# Patient Record
Sex: FEMALE | Race: WHITE | NOT HISPANIC OR LATINO | Employment: UNEMPLOYED | URBAN - METROPOLITAN AREA
[De-identification: names, ages, dates, MRNs, and addresses within clinical notes are randomized per-mention and may not be internally consistent; named-entity substitution may affect disease eponyms.]

---

## 2018-05-03 ENCOUNTER — OFFICE VISIT (OUTPATIENT)
Dept: INTERNAL MEDICINE | Facility: CLINIC | Age: 28
End: 2018-05-03

## 2018-05-03 VITALS
HEIGHT: 66 IN | TEMPERATURE: 97.1 F | SYSTOLIC BLOOD PRESSURE: 116 MMHG | WEIGHT: 173.6 LBS | BODY MASS INDEX: 27.9 KG/M2 | DIASTOLIC BLOOD PRESSURE: 70 MMHG

## 2018-05-03 DIAGNOSIS — R61 EXCESSIVE SWEATING: ICD-10-CM

## 2018-05-03 DIAGNOSIS — G43.909 MIGRAINE SYNDROME: ICD-10-CM

## 2018-05-03 DIAGNOSIS — R53.83 FATIGUE, UNSPECIFIED TYPE: Primary | ICD-10-CM

## 2018-05-03 LAB
25(OH)D3 SERPL-MCNC: 15 NG/ML
ALBUMIN SERPL-MCNC: 4.9 G/DL (ref 3.2–4.8)
ALBUMIN/GLOB SERPL: 2 G/DL (ref 1.5–2.5)
ALP SERPL-CCNC: 73 U/L (ref 25–100)
ALT SERPL W P-5'-P-CCNC: 12 U/L (ref 7–40)
ANION GAP SERPL CALCULATED.3IONS-SCNC: 8 MMOL/L (ref 3–11)
AST SERPL-CCNC: 17 U/L (ref 0–33)
BASOPHILS # BLD AUTO: 0.03 10*3/MM3 (ref 0–0.2)
BASOPHILS NFR BLD AUTO: 0.4 % (ref 0–1)
BILIRUB SERPL-MCNC: 0.2 MG/DL (ref 0.3–1.2)
BUN BLD-MCNC: 15 MG/DL (ref 9–23)
BUN/CREAT SERPL: 21.4 (ref 7–25)
CALCIUM SPEC-SCNC: 9.5 MG/DL (ref 8.7–10.4)
CHLORIDE SERPL-SCNC: 99 MMOL/L (ref 99–109)
CO2 SERPL-SCNC: 29 MMOL/L (ref 20–31)
CREAT BLD-MCNC: 0.7 MG/DL (ref 0.6–1.3)
DEPRECATED RDW RBC AUTO: 42.8 FL (ref 37–54)
EOSINOPHIL # BLD AUTO: 0.13 10*3/MM3 (ref 0–0.3)
EOSINOPHIL NFR BLD AUTO: 1.6 % (ref 0–3)
ERYTHROCYTE [DISTWIDTH] IN BLOOD BY AUTOMATED COUNT: 12.6 % (ref 11.3–14.5)
GFR SERPL CREATININE-BSD FRML MDRD: 98 ML/MIN/1.73
GLOBULIN UR ELPH-MCNC: 2.5 GM/DL
GLUCOSE BLD-MCNC: 101 MG/DL (ref 70–100)
HCT VFR BLD AUTO: 38.3 % (ref 34.5–44)
HGB BLD-MCNC: 12.4 G/DL (ref 11.5–15.5)
IMM GRANULOCYTES # BLD: 0.03 10*3/MM3 (ref 0–0.03)
IMM GRANULOCYTES NFR BLD: 0.4 % (ref 0–0.6)
LYMPHOCYTES # BLD AUTO: 2.64 10*3/MM3 (ref 0.6–4.8)
LYMPHOCYTES NFR BLD AUTO: 32.7 % (ref 24–44)
MCH RBC QN AUTO: 29.9 PG (ref 27–31)
MCHC RBC AUTO-ENTMCNC: 32.4 G/DL (ref 32–36)
MCV RBC AUTO: 92.3 FL (ref 80–99)
MONOCYTES # BLD AUTO: 0.64 10*3/MM3 (ref 0–1)
MONOCYTES NFR BLD AUTO: 7.9 % (ref 0–12)
NEUTROPHILS # BLD AUTO: 4.6 10*3/MM3 (ref 1.5–8.3)
NEUTROPHILS NFR BLD AUTO: 57 % (ref 41–71)
PLATELET # BLD AUTO: 296 10*3/MM3 (ref 150–450)
PMV BLD AUTO: 10.9 FL (ref 6–12)
POTASSIUM BLD-SCNC: 3.8 MMOL/L (ref 3.5–5.5)
PROT SERPL-MCNC: 7.4 G/DL (ref 5.7–8.2)
RBC # BLD AUTO: 4.15 10*6/MM3 (ref 3.89–5.14)
SODIUM BLD-SCNC: 136 MMOL/L (ref 132–146)
T4 FREE SERPL-MCNC: 1.08 NG/DL (ref 0.89–1.76)
TSH SERPL DL<=0.05 MIU/L-ACNC: 1.55 MIU/ML (ref 0.35–5.35)
VIT B12 BLD-MCNC: 525 PG/ML (ref 211–911)
WBC NRBC COR # BLD: 8.07 10*3/MM3 (ref 3.5–10.8)

## 2018-05-03 PROCEDURE — 85025 COMPLETE CBC W/AUTO DIFF WBC: CPT | Performed by: FAMILY MEDICINE

## 2018-05-03 PROCEDURE — 84439 ASSAY OF FREE THYROXINE: CPT | Performed by: FAMILY MEDICINE

## 2018-05-03 PROCEDURE — 36415 COLL VENOUS BLD VENIPUNCTURE: CPT | Performed by: FAMILY MEDICINE

## 2018-05-03 PROCEDURE — 82607 VITAMIN B-12: CPT | Performed by: FAMILY MEDICINE

## 2018-05-03 PROCEDURE — 84443 ASSAY THYROID STIM HORMONE: CPT | Performed by: FAMILY MEDICINE

## 2018-05-03 PROCEDURE — 82306 VITAMIN D 25 HYDROXY: CPT | Performed by: FAMILY MEDICINE

## 2018-05-03 PROCEDURE — 99204 OFFICE O/P NEW MOD 45 MIN: CPT | Performed by: FAMILY MEDICINE

## 2018-05-03 PROCEDURE — 80053 COMPREHEN METABOLIC PANEL: CPT | Performed by: FAMILY MEDICINE

## 2018-05-03 PROCEDURE — 84702 CHORIONIC GONADOTROPIN TEST: CPT | Performed by: FAMILY MEDICINE

## 2018-05-03 RX ORDER — SUMATRIPTAN 25 MG/1
TABLET, FILM COATED ORAL
Qty: 9 TABLET | Refills: 5 | Status: SHIPPED | OUTPATIENT
Start: 2018-05-03

## 2018-05-03 RX ORDER — SUMATRIPTAN 25 MG/1
TABLET, FILM COATED ORAL
COMMUNITY
Start: 2018-04-16 | End: 2018-05-03 | Stop reason: SDUPTHER

## 2018-05-03 RX ORDER — LOTEPREDNOL ETABONATE 5 MG/G
GEL OPHTHALMIC
COMMUNITY
Start: 2018-04-16

## 2018-05-03 NOTE — PATIENT INSTRUCTIONS
1. FATIGUE- will check labs including a serum pregnancy test. If she is pregnant, she is to see her OB.   2. NEURO- migraine- will RF imitrex today but pt is not take this if pregnant. Discussed that she cannot use excedrin either but she can take 2 tylenol with a cup of coffee. HA should improve while pregnant.   3. DERM- excessive perspiration- will refill drysol but will check pregnancy category before pt uses while pregnant.  4. RECHECK- 1mo unless pregnant

## 2018-05-04 ENCOUNTER — TELEPHONE (OUTPATIENT)
Dept: INTERNAL MEDICINE | Facility: CLINIC | Age: 28
End: 2018-05-04

## 2018-05-04 LAB — HCG INTACT+B SERPL-ACNC: <5 MIU/ML

## 2018-05-04 NOTE — TELEPHONE ENCOUNTER
PATIENT CALLED WANTING TO KNOW HER TEST RESULTS. PLEASE PT A CALL. PATIENT SAID IF SHE DOESN'T ANSWER YOU CAN LEAVE HER A DETAILED MESSAGE ON HER VOICEMAIL.

## 2018-05-04 NOTE — TELEPHONE ENCOUNTER
Jamee called seeking results to her pregnancy test. I did not see any Labs on this, but I did notice on the office note from yesterday that her urine pregnancy test was negative, however it seems the serum test was not ordered, so is this something we can just add on?

## 2018-09-11 ENCOUNTER — OFFICE VISIT (OUTPATIENT)
Dept: INTERNAL MEDICINE | Facility: CLINIC | Age: 28
End: 2018-09-11

## 2018-09-11 ENCOUNTER — TELEPHONE (OUTPATIENT)
Dept: INTERNAL MEDICINE | Facility: CLINIC | Age: 28
End: 2018-09-11

## 2018-09-11 VITALS — HEIGHT: 66 IN | WEIGHT: 178.4 LBS | TEMPERATURE: 97.8 F | BODY MASS INDEX: 28.67 KG/M2

## 2018-09-11 DIAGNOSIS — J01.90 ACUTE BACTERIAL SINUSITIS: Primary | ICD-10-CM

## 2018-09-11 DIAGNOSIS — B96.89 ACUTE BACTERIAL SINUSITIS: Primary | ICD-10-CM

## 2018-09-11 PROCEDURE — 99213 OFFICE O/P EST LOW 20 MIN: CPT | Performed by: FAMILY MEDICINE

## 2018-09-11 RX ORDER — CEFDINIR 300 MG/1
300 CAPSULE ORAL 2 TIMES DAILY
Qty: 20 CAPSULE | Refills: 0 | Status: SHIPPED | OUTPATIENT
Start: 2018-09-11

## 2018-09-11 RX ORDER — FLUTICASONE PROPIONATE 50 MCG
2 SPRAY, SUSPENSION (ML) NASAL DAILY
Qty: 16 G | Refills: 0 | Status: SHIPPED | OUTPATIENT
Start: 2018-09-11

## 2018-09-11 NOTE — PATIENT INSTRUCTIONS
1. RESP- sinusitis- will use omnicef as best antibiotic during pregnancy. Advised to use flonase for the cough and congestion, to be applied with a Q tip.   2. RECHECK- prn

## 2018-09-11 NOTE — TELEPHONE ENCOUNTER
PT HAS BEEN HAVING A LOT OF SINUS ISSUES, AND BEING PREGNANT SHE HASNT REALLY BEEN ABLE TO TAKE ANYTHING. SHE IS AVAILABLE ALL DAY Thursday, IS THERE ANYWHERE SHE CAN BE WORKED IN?

## 2018-09-11 NOTE — PROGRESS NOTES
"Dian St is a 28 y.o. female.     History of Present Illness   Here today as a work in for cough and congestion. Last seen 5/3/18 to re establish. No old records available. Pt is 21 wk IUP.     RESP- today pt reports she has been sick for a week. Is having head congestion with cheek pain. Starting to move to her chest with cough. Phlegm is green. Is taking claritin.     The following portions of the patient's history were reviewed and updated as appropriate: current medications, past family history, past medical history, past social history, past surgical history and problem list.    Review of Systems   HENT: Positive for congestion, postnasal drip and rhinorrhea.    Respiratory: Positive for cough.    Cardiovascular: Negative for chest pain.   Gastrointestinal: Negative for abdominal distention and abdominal pain.   Skin: Negative for color change.   Neurological: Negative for tremors, speech difficulty and headaches.   Psychiatric/Behavioral: Negative for agitation and confusion.   All other systems reviewed and are negative.        Current Outpatient Prescriptions:   •  aluminum chloride (DRYSOL) 20 % external solution, Apply  topically Every Night., Disp: 60 mL, Rfl: 5  •  cefdinir (OMNICEF) 300 MG capsule, Take 1 capsule by mouth 2 (Two) Times a Day., Disp: 20 capsule, Rfl: 0  •  fluticasone (FLONASE) 50 MCG/ACT nasal spray, 2 sprays into the nostril(s) as directed by provider Daily., Disp: 16 g, Rfl: 0  •  LOTEMAX 0.5 % gel ophthalmic gel, , Disp: , Rfl:   •  SUMAtriptan (IMITREX) 25 MG tablet, Take 1/2 to 1 tab prn migraine. May repeat every 2hr to max of 200mg in 24 hr, Disp: 9 tablet, Rfl: 5    Objective     Temp 97.8 °F (36.6 °C)   Ht 167.6 cm (66\")   Wt 80.9 kg (178 lb 6.4 oz)   BMI 28.79 kg/m²     Physical Exam   Constitutional: She is oriented to person, place, and time. She appears well-developed and well-nourished.   HENT:   Right Ear: Tympanic membrane and ear canal normal. "   Left Ear: Tympanic membrane and ear canal normal.   Mouth/Throat: Oropharynx is clear and moist.   Eyes: Pupils are equal, round, and reactive to light. Conjunctivae and EOM are normal.   Neck: No thyromegaly present.   Cardiovascular: Normal rate and regular rhythm.    Pulmonary/Chest: Effort normal and breath sounds normal.   Neurological: She is alert and oriented to person, place, and time.   Skin: Skin is warm and dry.   Psychiatric: She has a normal mood and affect.   Vitals reviewed.      Assessment/Plan   Jamee was seen today for sinusitis.    Diagnoses and all orders for this visit:    Acute bacterial sinusitis  -     cefdinir (OMNICEF) 300 MG capsule; Take 1 capsule by mouth 2 (Two) Times a Day.  -     fluticasone (FLONASE) 50 MCG/ACT nasal spray; 2 sprays into the nostril(s) as directed by provider Daily.      1. RESP- sinusitis- will use omnicef as best antibiotic during pregnancy. Advised to use flonase for the cough and congestion, to be applied with a Q tip.   2. RECHECK- prn

## 2020-01-28 NOTE — PROGRESS NOTES
"Dian St is a 28 y.o. female.     History of Present Illness   Here to re establish. Not seen in over 4yr. No previous records available. Pt had lasic eye surgery 2017 with residual dry eye, etc, seeing ophtho. Sees Gyn.     GYN- pt feels she may be pregnant. She is actively trying. Her period is due in 2 days. Has a 2.4yo. She has been very tired.     NEURO- migraine- currently on prn imiterx. Has been getting RF from Gyn. Has been getting 4 HA a month with 1 of them being severe. Uses excedrin with good results for most and then will add imitrex if needed. Does well with this.      DERM- excessive axillary perspiration. Pt has done well with drysol.     The following portions of the patient's history were reviewed and updated as appropriate: current medications, past family history, past medical history, past social history, past surgical history and problem list.    Review of Systems   Constitutional: Positive for fatigue.   Eyes: Positive for pain, redness and itching.   Cardiovascular: Negative for chest pain.   Gastrointestinal: Positive for nausea. Negative for abdominal distention and abdominal pain.   Genitourinary: Positive for pelvic pain and vaginal discharge.   Skin: Negative for color change.   Neurological: Positive for weakness and headaches. Negative for tremors and speech difficulty.   Psychiatric/Behavioral: Negative for agitation and confusion.   All other systems reviewed and are negative.        Current Outpatient Prescriptions:   •  aluminum chloride (DRYSOL) 20 % external solution, Apply  topically Every Night., Disp: 60 mL, Rfl: 5  •  LOTEMAX 0.5 % gel ophthalmic gel, , Disp: , Rfl:   •  SUMAtriptan (IMITREX) 25 MG tablet, Take 1/2 to 1 tab prn migraine. May repeat every 2hr to max of 200mg in 24 hr, Disp: 9 tablet, Rfl: 5    Objective     /70   Temp 97.1 °F (36.2 °C)   Ht 167.6 cm (66\")   Wt 78.7 kg (173 lb 9.6 oz)   BMI 28.02 kg/m²     Physical Exam "   Constitutional: She is oriented to person, place, and time. She appears well-developed and well-nourished.   HENT:   Right Ear: Tympanic membrane and ear canal normal.   Left Ear: Tympanic membrane and ear canal normal.   Mouth/Throat: Oropharynx is clear and moist.   Eyes: Conjunctivae and EOM are normal. Pupils are equal, round, and reactive to light.   Neck: No thyromegaly present.   Cardiovascular: Normal rate and regular rhythm.    Pulmonary/Chest: Effort normal and breath sounds normal.   Neurological: She is alert and oriented to person, place, and time.   Skin: Skin is warm and dry.   Psychiatric: She has a normal mood and affect.   Vitals reviewed.      Assessment/Plan   Jamee was seen today for establish care.    Diagnoses and all orders for this visit:    Fatigue, unspecified type  -     CBC & Differential  -     Comprehensive Metabolic Panel  -     Vitamin B12  -     Vitamin D 25 Hydroxy  -     TSH  -     T4, Free    Migraine syndrome  -     SUMAtriptan (IMITREX) 25 MG tablet; Take 1/2 to 1 tab prn migraine. May repeat every 2hr to max of 200mg in 24 hr    Excessive sweating  -     aluminum chloride (DRYSOL) 20 % external solution; Apply  topically Every Night.      1. FATIGUE- urine pregnancy test neg. will check labs including a serum pregnancy test. If she is pregnant, she is to see her OB.   2. NEURO- migraine- will RF imitrex today but pt is not take this if pregnant. Discussed that she cannot use excedrin either but she can take 2 tylenol with a cup of coffee. HA should improve while pregnant.   3. DERM- excessive perspiration- will refill drysol but will check pregnancy category before pt uses while pregnant.  4. RECHECK- 1mo unless pregnant          Ivermectin Counseling:  Patient instructed to take medication on an empty stomach with a full glass of water.  Patient informed of potential adverse effects including but not limited to nausea, diarrhea, dizziness, itching, and swelling of the extremities or lymph nodes.  The patient verbalized understanding of the proper use and possible adverse effects of ivermectin.  All of the patient's questions and concerns were addressed.

## 2024-06-10 ENCOUNTER — OFFICE VISIT (OUTPATIENT)
Dept: FAMILY MEDICINE CLINIC | Facility: CLINIC | Age: 34
End: 2024-06-10
Payer: COMMERCIAL

## 2024-06-10 VITALS
SYSTOLIC BLOOD PRESSURE: 118 MMHG | HEART RATE: 89 BPM | WEIGHT: 188 LBS | BODY MASS INDEX: 30.22 KG/M2 | OXYGEN SATURATION: 97 % | TEMPERATURE: 97.5 F | DIASTOLIC BLOOD PRESSURE: 84 MMHG | HEIGHT: 66 IN

## 2024-06-10 DIAGNOSIS — Z13.220 ENCOUNTER FOR LIPID SCREENING FOR CARDIOVASCULAR DISEASE: ICD-10-CM

## 2024-06-10 DIAGNOSIS — R19.8 CHANGE IN BOWEL MOVEMENT: ICD-10-CM

## 2024-06-10 DIAGNOSIS — M54.2 CERVICAL PAIN (NECK): ICD-10-CM

## 2024-06-10 DIAGNOSIS — R42 DIZZINESS: ICD-10-CM

## 2024-06-10 DIAGNOSIS — Z00.00 ENCOUNTER FOR WELL ADULT EXAM WITHOUT ABNORMAL FINDINGS: Primary | ICD-10-CM

## 2024-06-10 DIAGNOSIS — R68.89 FORGETFULNESS: ICD-10-CM

## 2024-06-10 DIAGNOSIS — Z13.6 ENCOUNTER FOR LIPID SCREENING FOR CARDIOVASCULAR DISEASE: ICD-10-CM

## 2024-06-10 DIAGNOSIS — R63.5 WEIGHT GAIN: ICD-10-CM

## 2024-06-10 DIAGNOSIS — E55.9 VITAMIN D DEFICIENCY: ICD-10-CM

## 2024-06-10 DIAGNOSIS — R61 EXCESSIVE SWEATING: ICD-10-CM

## 2024-06-10 DIAGNOSIS — R68.82 LOW LIBIDO: ICD-10-CM

## 2024-06-10 DIAGNOSIS — Z11.59 NEED FOR HEPATITIS C SCREENING TEST: ICD-10-CM

## 2024-06-10 DIAGNOSIS — G43.909 MIGRAINE SYNDROME: ICD-10-CM

## 2024-06-10 PROCEDURE — 99385 PREV VISIT NEW AGE 18-39: CPT | Performed by: PHYSICIAN ASSISTANT

## 2024-06-10 RX ORDER — SUMATRIPTAN 25 MG/1
TABLET, FILM COATED ORAL
Qty: 9 TABLET | Refills: 5 | Status: SHIPPED | OUTPATIENT
Start: 2024-06-10

## 2024-06-10 RX ORDER — CYCLOBENZAPRINE HCL 10 MG
10 TABLET ORAL NIGHTLY PRN
Qty: 30 TABLET | Refills: 2 | Status: SHIPPED | OUTPATIENT
Start: 2024-06-10

## 2024-06-10 RX ORDER — ALUMINUM CHLORIDE 20 %
SOLUTION, NON-ORAL TOPICAL NIGHTLY
Qty: 60 ML | Refills: 5 | Status: SHIPPED | OUTPATIENT
Start: 2024-06-10

## 2024-06-10 RX ORDER — OMEGA-3S/DHA/EPA/FISH OIL/D3 300MG-1000
CAPSULE ORAL
COMMUNITY
Start: 2024-02-15

## 2024-06-10 RX ORDER — CLOBETASOL PROPIONATE 0.5 MG/G
OINTMENT TOPICAL
COMMUNITY
Start: 2024-02-09

## 2024-06-10 RX ORDER — LEVONORGESTREL 52 MG/1
INTRAUTERINE DEVICE INTRAUTERINE
COMMUNITY

## 2024-06-10 NOTE — PROGRESS NOTES
Subjective   Jamee St is a 34 y.o. female.     History of Present Illness   History of Present Illness  The patient is a 34-year-old female who presents to establish care and for an annual physical exam.    The patient's last primary care visit was during her pregnancy in 2015. Approximately 3 years ago, she consulted a provider for a sinus infections and underwent routine laboratory tests. Her most recent gynecological examination was in 02/2024, during which a Pap smear revealed atypical cells, prompting ongoing monitoring. Her vitamin D level was recorded at 12, prompting a daily regimen of vitamin D. She reports experiencing fatigue, hair loss, and nausea post-lunch, with dizzy spells occurring approximately once a week. Over the past year, she has noticed fluctuations in her weight, although no changes have been made to her nutritional intake. She has a history of anemia during her childhood and gestational diabetes. She does not take any additional supplements. She has a Mirena intrauterine device (IUD) in place since 2021, having previously tolerated it well. She reports decreased libido, forgetfulness, and decreased confidence. She experiences frequent neck popping. For her headaches, she takes 4 ibuprofen tablets, approximately once a month.    The patient has previously sought treatment with ortho for neck issues, which included traction and a cervical pillow. However, the pain has since recurred. She has found relief from neck popping with traction and a cervical massager.    The patient expresses concern about potential Irritable Bowel Syndrome (IBS), which began approximately 1 month ago. She experiences 2 to 3 loose stools weekly. She does not experience nausea or localized pain over her gallbladder. She has been diagnosed with constipation and straining during bowel movements. She takes antidiarrheal medication, which provides relief. She does not suspect a gluten sensitivity.    The patient  "experiences 3 to 4 sinus infections annually, which she attributes to allergies. She uses a neti pot and occasionally uses a Medrol Dosepak and antibiotics when her symptoms worsen.    Hx of excess sweating.  She uses Drysol for relief. She has severe temporomandibular joint disorder (TMJ).   She denies any autoimmune issues in her family. Her mother has IBS.       The following portions of the patient's history were reviewed and updated as appropriate: allergies, current medications, past family history, past medical history, past social history, past surgical history, and problem list.    Review of Systems  As noted per HPI     Objective   Blood pressure 118/84, pulse 89, temperature 97.5 °F (36.4 °C), height 167.6 cm (66\"), weight 85.3 kg (188 lb), SpO2 97%.     Physical Exam  Vitals reviewed.   Constitutional:       Appearance: Normal appearance.   Cardiovascular:      Rate and Rhythm: Normal rate and regular rhythm.   Pulmonary:      Effort: Pulmonary effort is normal.      Breath sounds: Normal breath sounds.   Abdominal:      General: Abdomen is flat. Bowel sounds are normal.      Tenderness: There is no abdominal tenderness.   Skin:     General: Skin is warm and dry.   Neurological:      Mental Status: She is alert and oriented to person, place, and time.   Psychiatric:         Mood and Affect: Mood normal.         Behavior: Behavior normal.         Results      Assessment & Plan   Assessment & Plan  1. Annual physical examination.  A comprehensive panel of laboratory tests will be conducted to assess her vitamin D levels, iron, B12, cortisol, insulin, and magnesium levels. The patient will be informed of the results upon their availability.    2. Cervicalgia.  The patient will be referred to [facility] for physical therapy. A muscle relaxant will be prescribed for the patient to take as needed at bedtime.    3. Irritable Bowel Syndrome (IBS).  An abdominal x-ray will be obtained to evaluate for " potential constipation. A basic food allergy profile and a celiac screen will be conducted.    4. Migraine headaches.  The patient's prescription for Imitrex will be renewed.         Diagnoses and all orders for this visit:    1. Encounter for well adult exam without abnormal findings (Primary)  -     CBC w AUTO Differential  -     Comprehensive metabolic panel  -     TSH  -     T4, free  -     Thyroid Antibodies  -     Hemoglobin A1c  -     Lipid Panel  -     T3, free  -     Hepatitis C antibody  -     Vitamin D 25 hydroxy  -     Iron Profile  -     Vitamin B12  -     Folate  -     Cortisol - AM  -     Insulin, Total  -     Magnesium  -     Celiac Disease Antibody Screen  -     Food Allergy Profile    2. Vitamin D deficiency  -     Vitamin D 25 hydroxy    3. Dizziness  -     TSH  -     T4, free  -     Hemoglobin A1c  -     T3, free  -     Iron Profile  -     Vitamin B12  -     Folate  -     Cortisol - AM  -     Insulin, Total  -     Magnesium    4. Weight gain  -     CBC w AUTO Differential  -     Comprehensive metabolic panel  -     TSH  -     T4, free  -     Thyroid Antibodies    5. Encounter for lipid screening for cardiovascular disease  -     TSH  -     T4, free  -     Thyroid Antibodies  -     Lipid Panel    6. Need for hepatitis C screening test  -     Hepatitis C antibody    7. Change in bowel movement  -     Magnesium  -     Celiac Disease Antibody Screen  -     Food Allergy Profile  -     XR Abdomen 1 View    8. Low libido  -     CBC w AUTO Differential  -     Comprehensive metabolic panel  -     TSH  -     T4, free  -     Thyroid Antibodies  -     Iron Profile  -     Vitamin B12  -     Folate    9. Forgetfulness  -     CBC w AUTO Differential  -     Comprehensive metabolic panel  -     TSH  -     T4, free  -     Thyroid Antibodies  -     Iron Profile  -     Vitamin B12  -     Folate    10. Cervical pain (neck)  -     Ambulatory Referral to Physical Therapy  -     cyclobenzaprine (FLEXERIL) 10 MG tablet;  Take 1 tablet by mouth At Night As Needed for Muscle Spasms.  Dispense: 30 tablet; Refill: 2    11. Migraine syndrome  -     SUMAtriptan (IMITREX) 25 MG tablet; Take 1/2 to 1 tab prn migraine. May repeat every 2hr to max of 200mg in 24 hr  Dispense: 9 tablet; Refill: 5  -     aluminum chloride (Drysol) 20 % external solution; Apply  topically to the appropriate area as directed Every Night.  Dispense: 60 mL; Refill: 5    12. Excessive sweating  -     aluminum chloride (Drysol) 20 % external solution; Apply  topically to the appropriate area as directed Every Night.  Dispense: 60 mL; Refill: 5      Counseling was given to patient for the following topics: instructions for management, risk factor reductions, patient and family education, impressions, and risks and benefits of treatment options . Total time of the encounter was 20 minutes and 10 minutes was spent counseling.           Patient or patient representative verbalized consent for the use of Ambient Listening during the visit with  DMITRY Hunt for chart documentation. 6/23/2024  12:25 EDT

## 2024-06-13 ENCOUNTER — LAB (OUTPATIENT)
Dept: FAMILY MEDICINE CLINIC | Facility: CLINIC | Age: 34
End: 2024-06-13
Payer: COMMERCIAL

## 2024-06-16 LAB
25(OH)D3+25(OH)D2 SERPL-MCNC: 22.5 NG/ML (ref 30–100)
ALBUMIN SERPL-MCNC: 4.8 G/DL (ref 3.9–4.9)
ALBUMIN/GLOB SERPL: 2 {RATIO}
ALP SERPL-CCNC: 87 IU/L (ref 44–121)
ALT SERPL-CCNC: 25 IU/L (ref 0–32)
AST SERPL-CCNC: 23 IU/L (ref 0–40)
BASOPHILS # BLD AUTO: 0 X10E3/UL (ref 0–0.2)
BASOPHILS NFR BLD AUTO: 1 %
BILIRUB SERPL-MCNC: 0.3 MG/DL (ref 0–1.2)
BUN SERPL-MCNC: 10 MG/DL (ref 6–20)
BUN/CREAT SERPL: 16 (ref 9–23)
CALCIUM SERPL-MCNC: 9.6 MG/DL (ref 8.7–10.2)
CHLORIDE SERPL-SCNC: 100 MMOL/L (ref 96–106)
CHOLEST SERPL-MCNC: 185 MG/DL (ref 100–199)
CLAM IGE QN: <0.1 KU/L
CO2 SERPL-SCNC: 20 MMOL/L (ref 20–29)
CODFISH IGE QN: <0.1 KU/L
CONV CLASS DESCRIPTION: NORMAL
CORN IGE QN: <0.1 KU/L
CORTIS AM PEAK SERPL-MCNC: 15.2 UG/DL (ref 6.2–19.4)
COW MILK IGE QN: <0.1 KU/L
CREAT SERPL-MCNC: 0.64 MG/DL (ref 0.57–1)
EGFRCR SERPLBLD CKD-EPI 2021: 117 ML/MIN/1.73
EGG WHITE IGE QN: <0.1 KU/L
EOSINOPHIL # BLD AUTO: 0.1 X10E3/UL (ref 0–0.4)
EOSINOPHIL NFR BLD AUTO: 1 %
ERYTHROCYTE [DISTWIDTH] IN BLOOD BY AUTOMATED COUNT: 12.4 % (ref 11.7–15.4)
FOLATE SERPL-MCNC: 10.3 NG/ML
GLIADIN PEPTIDE IGA SER-ACNC: 4 UNITS (ref 0–19)
GLOBULIN SER CALC-MCNC: 2.4 G/DL (ref 1.5–4.5)
GLUCOSE SERPL-MCNC: 93 MG/DL (ref 70–99)
HBA1C MFR BLD: 5.7 % (ref 4.8–5.6)
HCT VFR BLD AUTO: 38.7 % (ref 34–46.6)
HCV IGG SERPL QL IA: NON REACTIVE
HDLC SERPL-MCNC: 44 MG/DL
HGB BLD-MCNC: 13 G/DL (ref 11.1–15.9)
IGA SERPL-MCNC: 164 MG/DL (ref 87–352)
IMM GRANULOCYTES # BLD AUTO: 0 X10E3/UL (ref 0–0.1)
IMM GRANULOCYTES NFR BLD AUTO: 1 %
INSULIN SERPL-ACNC: 18.6 UIU/ML (ref 2.6–24.9)
IRON SATN MFR SERPL: 26 % (ref 15–55)
IRON SERPL-MCNC: 88 UG/DL (ref 27–159)
LDLC SERPL CALC-MCNC: 113 MG/DL (ref 0–99)
LYMPHOCYTES # BLD AUTO: 2 X10E3/UL (ref 0.7–3.1)
LYMPHOCYTES NFR BLD AUTO: 31 %
MAGNESIUM SERPL-MCNC: 2.1 MG/DL (ref 1.6–2.3)
MCH RBC QN AUTO: 30.2 PG (ref 26.6–33)
MCHC RBC AUTO-ENTMCNC: 33.6 G/DL (ref 31.5–35.7)
MCV RBC AUTO: 90 FL (ref 79–97)
MONOCYTES # BLD AUTO: 0.5 X10E3/UL (ref 0.1–0.9)
MONOCYTES NFR BLD AUTO: 8 %
NEUTROPHILS # BLD AUTO: 3.7 X10E3/UL (ref 1.4–7)
NEUTROPHILS NFR BLD AUTO: 58 %
PEANUT IGE QN: <0.1 KU/L
PLATELET # BLD AUTO: 296 X10E3/UL (ref 150–450)
POTASSIUM SERPL-SCNC: 3.9 MMOL/L (ref 3.5–5.2)
PROT SERPL-MCNC: 7.2 G/DL (ref 6–8.5)
RBC # BLD AUTO: 4.3 X10E6/UL (ref 3.77–5.28)
SCALLOP IGE QN: <0.1 KU/L
SESAME SEED IGE QN: <0.1 KU/L
SHRIMP IGE QN: <0.1 KU/L
SODIUM SERPL-SCNC: 140 MMOL/L (ref 134–144)
SOYBEAN IGE QN: <0.1 KU/L
T3FREE SERPL-MCNC: 3.9 PG/ML (ref 2–4.4)
T4 FREE SERPL-MCNC: 1.02 NG/DL (ref 0.82–1.77)
THYROGLOB AB SERPL-ACNC: <1 IU/ML (ref 0–0.9)
THYROPEROXIDASE AB SERPL-ACNC: <9 IU/ML (ref 0–34)
TIBC SERPL-MCNC: 345 UG/DL (ref 250–450)
TRIGL SERPL-MCNC: 156 MG/DL (ref 0–149)
TSH SERPL DL<=0.005 MIU/L-ACNC: 2.21 UIU/ML (ref 0.45–4.5)
TTG IGA SER-ACNC: <2 U/ML (ref 0–3)
UIBC SERPL-MCNC: 257 UG/DL (ref 131–425)
VIT B12 SERPL-MCNC: 372 PG/ML (ref 232–1245)
VLDLC SERPL CALC-MCNC: 28 MG/DL (ref 5–40)
WALNUT IGE QN: <0.1 KU/L
WBC # BLD AUTO: 6.4 X10E3/UL (ref 3.4–10.8)
WHEAT IGE QN: <0.1 KU/L

## 2024-06-18 ENCOUNTER — PATIENT ROUNDING (BHMG ONLY) (OUTPATIENT)
Dept: FAMILY MEDICINE CLINIC | Facility: CLINIC | Age: 34
End: 2024-06-18
Payer: COMMERCIAL

## 2024-06-19 ENCOUNTER — TELEPHONE (OUTPATIENT)
Dept: FAMILY MEDICINE CLINIC | Facility: CLINIC | Age: 34
End: 2024-06-19
Payer: COMMERCIAL

## 2024-06-19 NOTE — TELEPHONE ENCOUNTER
Regarding: Test results  Contact: [phone]  ----- Message from DMITRY Hunt sent at 6/19/2024  2:37 PM EDT -----  See result note     ----- Message from Jamee St to Sari Peñaloza PA sent at 6/19/2024 10:41 AM -----   I received my results on the portal but haven’t got a call, just not feeling good and wanted to know next steps. Thanks so much!

## 2024-06-20 ENCOUNTER — TELEPHONE (OUTPATIENT)
Dept: FAMILY MEDICINE CLINIC | Facility: CLINIC | Age: 34
End: 2024-06-20
Payer: COMMERCIAL

## 2024-06-20 DIAGNOSIS — E55.9 VITAMIN D DEFICIENCY: Primary | ICD-10-CM

## 2024-06-20 RX ORDER — ERGOCALCIFEROL 1.25 MG/1
50000 CAPSULE ORAL WEEKLY
Qty: 5 CAPSULE | Refills: 5 | Status: SHIPPED | OUTPATIENT
Start: 2024-06-20

## 2024-06-20 NOTE — TELEPHONE ENCOUNTER
Pt would like to know if you would be able to send in the vit d to the pharmacy.     Please advise

## 2024-06-21 ENCOUNTER — TELEPHONE (OUTPATIENT)
Dept: FAMILY MEDICINE CLINIC | Facility: CLINIC | Age: 34
End: 2024-06-21
Payer: COMMERCIAL

## 2024-06-24 NOTE — TELEPHONE ENCOUNTER
Xr of abdomen showed formed stool throughout colon consistent with constipation. Recommend miralax OTC daily until bowels regulate. If not helping clear out stool, recommend magnesium citrate OTC liquid.

## 2024-07-02 ENCOUNTER — TELEPHONE (OUTPATIENT)
Dept: FAMILY MEDICINE CLINIC | Facility: CLINIC | Age: 34
End: 2024-07-02
Payer: COMMERCIAL

## 2024-07-02 NOTE — TELEPHONE ENCOUNTER
With her vit d still being deficient even with regular supplementation, I sent the high dose once weekly D2. This should help bump up levels and then she can go back to OTC D3 daily.

## 2024-07-02 NOTE — TELEPHONE ENCOUNTER
----- Message from Pricila GILLIAM sent at 7/1/2024  5:32 PM EDT -----  Regarding: FW: Vitamin D   Contact: [phone]    ----- Message -----  From: Jamee St  Sent: 6/28/2024   6:36 AM EDT  To: [facility]  Subject: Vitamin D                                        It looks like Vitamin D2 was sent in but last last rx was D3 please resend the Vitamin D3.

## 2024-07-24 ENCOUNTER — TELEPHONE (OUTPATIENT)
Dept: FAMILY MEDICINE CLINIC | Facility: CLINIC | Age: 34
End: 2024-07-24
Payer: COMMERCIAL

## 2024-07-24 DIAGNOSIS — E66.9 OBESITY (BMI 30.0-34.9): Primary | ICD-10-CM

## 2024-07-24 NOTE — TELEPHONE ENCOUNTER
"Spoke w/ pt and she say's, she is really not having that issue anymore. She thought it was just \"job stress\" related.   "

## 2024-07-24 NOTE — TELEPHONE ENCOUNTER
Rx sent. We will do initial prior authorization with insurance for weight loss aids, but we do not do recurrent PA/ MICKI if a dose is unavailable from pharmacy due to back order. Will need to follow up in 3 months from start date

## 2024-07-24 NOTE — TELEPHONE ENCOUNTER
Please call patient. When I saw her for her new patient visit in June she was experiencing GI symptoms and bowel changes. GLP-1 injections can cause significant GI side effects with worsening constipation, diarrhea and nausea. Is she sure she wants to try this?

## 2024-07-24 NOTE — TELEPHONE ENCOUNTER
----- Message from Yesenia ZAMUDIO sent at 7/24/2024 11:47 AM EDT -----  Regarding: FW: Shot   Contact: [phone]    ----- Message -----  From: Jamee St  Sent: 7/24/2024  11:25 AM EDT  To: [facility] Pool  Subject: Shot                                             Hello,     wanted to let y’all know that our insurance is now active. I would like to go ahead a proceed getting the shot for weight loss. Before seeing you I already worked out and starting dieting. I’m feeling very discouraged a depressed about my weight and with now being a pre diabetic I’m ready to start it. I think it will good changes for my well being. Can we please start the process. I’ve tried losing weight for years now and nothing has helped. Thanks so much!

## 2024-08-02 ENCOUNTER — PATIENT MESSAGE (OUTPATIENT)
Dept: FAMILY MEDICINE CLINIC | Facility: CLINIC | Age: 34
End: 2024-08-02
Payer: COMMERCIAL

## 2024-08-05 ENCOUNTER — TELEPHONE (OUTPATIENT)
Dept: FAMILY MEDICINE CLINIC | Facility: CLINIC | Age: 34
End: 2024-08-05
Payer: COMMERCIAL

## 2024-08-05 NOTE — TELEPHONE ENCOUNTER
Case Management Note    Received VM from client's intake assessor Olivia Helps requesting to set a time for Olivia to meet with client in person on site to discuss case management services. Reviewed events of the weekend and noted mom's report she is going to evict client's sister, Tigre. Eagle noted support of this plan and reported she connected yesterday with client's CPS , Rehana Berumen.    PA for Zepbound  Updated insurance  No need to resubmit, Maru started the first PA with correct insurance and it was denied  LVM to PT

## 2024-08-05 NOTE — TELEPHONE ENCOUNTER
From: Jamee St  To: Sari Peñaloza  Sent: 8/2/2024 3:22 PM EDT  Subject: Zepbound    I’m checking on my pre auth through my insurance on the Zepbound. I did send our new insurance:)

## 2024-08-08 ENCOUNTER — TELEPHONE (OUTPATIENT)
Dept: FAMILY MEDICINE CLINIC | Facility: CLINIC | Age: 34
End: 2024-08-08
Payer: COMMERCIAL

## 2024-08-08 DIAGNOSIS — E66.9 OBESITY (BMI 30-39.9): Primary | ICD-10-CM

## 2024-08-08 RX ORDER — SEMAGLUTIDE 0.25 MG/.5ML
0.25 INJECTION, SOLUTION SUBCUTANEOUS
Qty: 2 ML | Refills: 0 | Status: SHIPPED | OUTPATIENT
Start: 2024-08-08

## 2024-08-08 NOTE — TELEPHONE ENCOUNTER
Regarding: zepbound  Contact: [phone]  ----- Message from Marissa English MA sent at 8/8/2024 10:55 AM EDT -----  Weight loss medication denied for Zepbound     ----- Message sent from Marissa English MA to Jamee St at 8/8/2024 10:54 AM -----   Yes I will send this to Sari, we were just talking about your A1C because you had requested a diagnosis of Pre- diabetes. If your Insurance did not except the Zepbound, more than likely they will not approve any other injections.  There are other medications (oral) that might be approved, Phentermine would need to be prescribe by a MD. just depends on your insurance, this would be if they pay for any weight loss medications. I know some insurance's will not cover any weight loss medication.  Marissa MALDONADO      ----- Message -----       From:Jamee St       Sent:8/6/2024  5:11 PM EDT         To:User Message Message List    Subject:zepbound    Please speak to Preethi about switching to a different kind of injection. I have several friends that were able to get it approved and they see you all and don’t have a diagnosis for diabetes?       ----- Message -----       From:Marissa YAÑEZ       Sent:8/6/2024  9:59 AM EDT         To:Jamee St    Subject:john Mancuso, I noticed after starting the new PA we did use the correct Insurance the first time,  there is no need to continue this new PA. A1C levels are not high enough to consider a Diagnosis for Prediabetes, this will need to be at least 6.5 before insurance will pay for medication. Your A1C was 5.7 this is why the Diagnosis is for weight loss  Marissa MALDONADO

## 2024-08-08 NOTE — TELEPHONE ENCOUNTER
Will send in Wegovy to see if this is covered but it is likely her insurance company does not cover for weight loss aids. Unfortunately, there is nothing we can do on our end if these are not on her insurance's formulary. She can try discount card on company's website to see if this makes medications more affordable or go to a weight loss clinic such as [facility] or [facility] that offer compounding options at a flat rate without insurance.

## 2024-08-20 ENCOUNTER — TELEPHONE (OUTPATIENT)
Dept: FAMILY MEDICINE CLINIC | Facility: CLINIC | Age: 34
End: 2024-08-20
Payer: COMMERCIAL

## 2024-08-20 NOTE — TELEPHONE ENCOUNTER
Pt called to check status of wellness form she dropped off on Thursday.  Pt states her  will be by tomorrow to pick it up.

## 2024-09-26 ENCOUNTER — TELEPHONE (OUTPATIENT)
Dept: FAMILY MEDICINE CLINIC | Facility: CLINIC | Age: 34
End: 2024-09-26

## 2024-09-26 DIAGNOSIS — E66.9 OBESITY (BMI 30-39.9): ICD-10-CM

## 2024-09-26 RX ORDER — SEMAGLUTIDE 0.25 MG/.5ML
0.25 INJECTION, SOLUTION SUBCUTANEOUS
Qty: 2 ML | Refills: 0 | Status: SHIPPED | OUTPATIENT
Start: 2024-09-26

## 2024-09-26 RX ORDER — SEMAGLUTIDE 0.25 MG/.5ML
0.25 INJECTION, SOLUTION SUBCUTANEOUS
Qty: 2 ML | Refills: 0 | Status: CANCELLED | OUTPATIENT
Start: 2024-09-26

## 2024-09-26 NOTE — TELEPHONE ENCOUNTER
Caller: Jamee St    Relationship: Self    Best call back number: [phone]     Requested Prescriptions:   Requested Prescriptions     Pending Prescriptions Disp Refills    Semaglutide-Weight Management (Wegovy) 0.25 MG/0.5ML solution auto-injector 2 mL 0     Sig: Inject 0.5 mL under the skin into the appropriate area as directed Every 7 (Seven) Days.        Pharmacy where request should be sent: [id number] - [address] [phone] [facility] [phone] [facility]     Last office visit with prescribing clinician: 6/10/2024   Last telemedicine visit with prescribing clinician: Visit date not found   Next office visit with prescribing clinician: 6/12/2025     Additional details provided by patient: PATIENT ADVISED HER INSURANCE WON'T COVER THE ZEPBOUND BUT TOLD HER THEY MAY COVER WEGOVY.      Does the patient have less than a 3 day supply:  [x] Yes  [] No    Would you like a call back once the refill request has been completed: [x] Yes [] No    If the office needs to give you a call back, can they leave a voicemail: [x] Yes [] No    Laura Tan Rep   09/26/24 08:10 EDT

## 2024-09-26 NOTE — TELEPHONE ENCOUNTER
Resent rx as pt said they did not get it, however she says her ins will cover this for prediabetes so PA may get sent over.

## 2025-04-08 ENCOUNTER — TELEPHONE (OUTPATIENT)
Dept: FAMILY MEDICINE CLINIC | Facility: CLINIC | Age: 35
End: 2025-04-08

## 2025-04-08 NOTE — TELEPHONE ENCOUNTER
Caller: Jamee St    Relationship to patient: Self    Best call back number: [phone]    Chief complaint: PHYSICAL     Type of visit: PHYSICAL     Requested date: A FRIDAY     If rescheduling, when is the original appointment: 06/12/2025    Additional notes:PATIENT IS CALLING IN SHE RECEIVED  A CALL ASKING HE RESCHEDULE THE APPOINTMENT SHE NEEDS A FRIDAY APPOINTMENT BUT THERE ARE NONE AVAILABLE DHE PREFERS TO SEE IVORY BUT IF SHE DOES NOT HAVE A FRIDAY THE PATIENT STATES THAT SHE WOULD LIKE TO SEE DOCTOR WANG ON A FRIDAY

## 2025-06-20 ENCOUNTER — OFFICE VISIT (OUTPATIENT)
Dept: FAMILY MEDICINE CLINIC | Facility: CLINIC | Age: 35
End: 2025-06-20
Payer: COMMERCIAL

## 2025-06-20 ENCOUNTER — PRIOR AUTHORIZATION (OUTPATIENT)
Dept: FAMILY MEDICINE CLINIC | Facility: CLINIC | Age: 35
End: 2025-06-20
Payer: COMMERCIAL

## 2025-06-20 VITALS
HEIGHT: 66 IN | TEMPERATURE: 98.6 F | SYSTOLIC BLOOD PRESSURE: 112 MMHG | HEART RATE: 86 BPM | WEIGHT: 179 LBS | DIASTOLIC BLOOD PRESSURE: 80 MMHG | RESPIRATION RATE: 18 BRPM | BODY MASS INDEX: 28.77 KG/M2 | OXYGEN SATURATION: 98 %

## 2025-06-20 DIAGNOSIS — E66.3 OVERWEIGHT (BMI 25.0-29.9): ICD-10-CM

## 2025-06-20 DIAGNOSIS — J06.9 ACUTE URI: ICD-10-CM

## 2025-06-20 DIAGNOSIS — G43.909 MIGRAINE SYNDROME: ICD-10-CM

## 2025-06-20 DIAGNOSIS — H66.001 NON-RECURRENT ACUTE SUPPURATIVE OTITIS MEDIA OF RIGHT EAR WITHOUT SPONTANEOUS RUPTURE OF TYMPANIC MEMBRANE: ICD-10-CM

## 2025-06-20 DIAGNOSIS — R73.03 PREDIABETES: ICD-10-CM

## 2025-06-20 DIAGNOSIS — J30.89 ALLERGIC RHINITIS DUE TO OTHER ALLERGIC TRIGGER, UNSPECIFIED SEASONALITY: ICD-10-CM

## 2025-06-20 DIAGNOSIS — R61 EXCESSIVE SWEATING: ICD-10-CM

## 2025-06-20 DIAGNOSIS — R68.82 LOW LIBIDO: ICD-10-CM

## 2025-06-20 DIAGNOSIS — R53.83 OTHER FATIGUE: ICD-10-CM

## 2025-06-20 DIAGNOSIS — M54.2 CERVICAL PAIN (NECK): ICD-10-CM

## 2025-06-20 DIAGNOSIS — E55.9 VITAMIN D DEFICIENCY: ICD-10-CM

## 2025-06-20 DIAGNOSIS — Z00.00 ANNUAL PHYSICAL EXAM: Primary | ICD-10-CM

## 2025-06-20 RX ORDER — NYSTATIN 100000 [USP'U]/G
POWDER TOPICAL
COMMUNITY
Start: 2025-03-04

## 2025-06-20 RX ORDER — CYCLOBENZAPRINE HCL 10 MG
10 TABLET ORAL NIGHTLY PRN
Qty: 30 TABLET | Refills: 2 | Status: SHIPPED | OUTPATIENT
Start: 2025-06-20

## 2025-06-20 RX ORDER — SUMATRIPTAN SUCCINATE 25 MG/1
TABLET ORAL
Qty: 9 TABLET | Refills: 5 | Status: SHIPPED | OUTPATIENT
Start: 2025-06-20

## 2025-06-20 RX ORDER — PREDNISONE 10 MG/1
TABLET ORAL
Qty: 21 TABLET | Refills: 0 | Status: SHIPPED | OUTPATIENT
Start: 2025-06-20

## 2025-06-20 RX ORDER — ALUMINUM CHLORIDE 20 %
SOLUTION, NON-ORAL TOPICAL NIGHTLY
Qty: 60 ML | Refills: 5 | Status: SHIPPED | OUTPATIENT
Start: 2025-06-20

## 2025-06-20 NOTE — PROGRESS NOTES
Chief Complaint  Annual Exam    Subjective     {Problem List  Visit Diagnosis   Encounters  Notes  Medications  Labs  Result Review Imaging  Media :23}     Jamee St presents to [facility]  History of Present Illness      {Common H&P Review Areas:55802}    Objective      Physical Exam   Result Review :{Labs  Result Review  Imaging  Med Tab  Media :23}   {The following data was reviewed by (Optional):83585}  {Ambulatory Labs (Optional):38591}           Assessment and Plan {CC Problem List  Visit Diagnosis  ROS  Review (Popup)  Health Maintenance  Quality  BestPractice  Medications  SmartSets  SnapShot Encounters  Media :23}   There are no diagnoses linked to this encounter.  {Time Spent (Optional):39990}  Follow Up {Instructions Charge Capture  Follow-up Communications :23}  No follow-ups on file.  Patient was given instructions and counseling regarding her condition or for health maintenance advice. Please see specific information pulled into the AVS if appropriate.

## 2025-06-20 NOTE — PROGRESS NOTES
Chief Complaint  Annual Exam    Subjective          Jamee St presents to [facility]    History of Present Illness  The patient presents for an annual exam, weight management, prediabetes, sinus infection, and medication management. She is a patient of DMITRY Hunt.    She has been experiencing symptoms of low libido and night sweats, raising concerns about hormones, particularly testosterone.  She also reports fatigue.  Her mother experienced early menopause. She is currently using a Mirena intrauterine device (IUD) for contraception and has not had a menstrual period since its insertion. She had a gynecological examination 2 months ago, subsequent Pap smear results were normal.     She acknowledges the need for a vision check-up and maintains regular dental hygiene practices.    Efforts to lose weight include regular exercise, specifically walking for approximately 1.5 hours daily, and adhering to a healthy diet. Despite these efforts, she has not observed any significant changes in her weight.    She has been diagnosed with prediabetes and continues to experience episodes of lightheadedness, shakiness, and clamminess, particularly after consuming sugary drinks. She expresses a desire to have her blood sugar levels rechecked. She is currently fasting for blood work.    She has been experiencing phlegm production and allergy symptoms, including coughing up yellow sputum. She has had 4 sinus infections this year, which is not unusual for her. She reports that her right ear feels blocked when she blows her nose in the morning. She has been taking Xyzal in the morning to manage her allergy symptoms. She has 4 remaining tablets of amoxicillin from a previous prescription, which she did not complete as she felt better. She completed a course of prednisone 3 months ago and tolerated it well. She contracted COVID-19 in 2020 and has noticed that her mucus has thickened since  then.    She continues to take Imitrex intermittently, half a tablet as needed, for migraine management. She also takes Flexeril once a month for neck itching.    FAMILY HISTORY  Her father was a diabetic, which ultimately led to his death due to kidney failure.  Her mother experienced early menopause.      The following portions of the patient's history were reviewed and updated as appropriate: allergies, current medications, past family history, past medical history, past social history, past surgical history, and problem list.    Objective      Physical Exam  Vitals and nursing note reviewed.   HENT:      Right Ear: Ear canal and external ear normal. A middle ear effusion is present. Tympanic membrane is erythematous.      Left Ear: Tympanic membrane, ear canal and external ear normal.   Cardiovascular:      Rate and Rhythm: Normal rate and regular rhythm.      Heart sounds: Normal heart sounds.   Pulmonary:      Effort: Pulmonary effort is normal.      Breath sounds: Normal breath sounds.   Neurological:      Mental Status: She is alert.   Psychiatric:         Mood and Affect: Mood normal.        Physical Exam      Result Review :       Results               Assessment and Plan    Diagnoses and all orders for this visit:    1. Annual physical exam (Primary)  -     Hemoglobin A1c  -     TSH+Free T4  -     Vitamin D,25-Hydroxy  -     CBC (No Diff)  -     Comprehensive Metabolic Panel  -     Lipid Panel With / Chol / HDL Ratio    2. Prediabetes  -     Hemoglobin A1c  -     TSH+Free T4  -     Vitamin D,25-Hydroxy  -     CBC (No Diff)  -     Comprehensive Metabolic Panel  -     Lipid Panel With / Chol / HDL Ratio    3. Acute URI  -     amoxicillin-clavulanate (AUGMENTIN) 875-125 MG per tablet; Take 1 tablet by mouth 2 (Two) Times a Day.  Dispense: 20 tablet; Refill: 0  -     predniSONE (DELTASONE) 10 MG (21) dose pack; Use as directed on package  Dispense: 21 tablet; Refill: 0    4. Non-recurrent acute suppurative  otitis media of right ear without spontaneous rupture of tympanic membrane  -     amoxicillin-clavulanate (AUGMENTIN) 875-125 MG per tablet; Take 1 tablet by mouth 2 (Two) Times a Day.  Dispense: 20 tablet; Refill: 0  -     predniSONE (DELTASONE) 10 MG (21) dose pack; Use as directed on package  Dispense: 21 tablet; Refill: 0    5. Vitamin D deficiency  -     Hemoglobin A1c  -     TSH+Free T4  -     Vitamin D,25-Hydroxy  -     CBC (No Diff)  -     Comprehensive Metabolic Panel  -     Lipid Panel With / Chol / HDL Ratio    6. Low libido  -     Estradiol  -     Testosterone  -     FSH & LH    7. Allergic rhinitis due to other allergic trigger, unspecified seasonality  -     Ambulatory Referral to Allergy    8. Migraine syndrome  -     SUMAtriptan (IMITREX) 25 MG tablet; Take 1/2 to 1 tab prn migraine. May repeat every 2hr to max of 200mg in 24 hr  Dispense: 9 tablet; Refill: 5  -     aluminum chloride (Drysol) 20 % external solution; Apply  topically to the appropriate area as directed Every Night.  Dispense: 60 mL; Refill: 5    9. Other fatigue  -     TSH+Free T4  -     Estradiol  -     Testosterone  -     FSH & LH    10. Excessive sweating  -     aluminum chloride (Drysol) 20 % external solution; Apply  topically to the appropriate area as directed Every Night.  Dispense: 60 mL; Refill: 5    11. Cervical pain (neck)  -     cyclobenzaprine (FLEXERIL) 10 MG tablet; Take 1 tablet by mouth At Night As Needed for Muscle Spasms.  Dispense: 30 tablet; Refill: 2    12. Overweight (BMI 25.0-29.9)  -     Hemoglobin A1c  -     TSH+Free T4  -     Vitamin D,25-Hydroxy  -     CBC (No Diff)  -     Comprehensive Metabolic Panel  -     Lipid Panel With / Chol / HDL Ratio    13. BMI 28.0-28.9,adult  -     Hemoglobin A1c  -     TSH+Free T4  -     Vitamin D,25-Hydroxy  -     CBC (No Diff)  -     Comprehensive Metabolic Panel  -     Lipid Panel With / Chol / HDL Ratio      Assessment & Plan  1. Annual examination.  - Her A1c levels from  the previous year were marginally elevated, placing her in the prediabetic range. Additionally, her vitamin D levels were slightly deficient. Her cholesterol levels were within normal limits, with only a slight elevation in triglycerides and LDL.  - Comprehensive lab workup will be ordered, including tests for estradiol and testosterone.    2. Weight management.  - She is trying to lose weight through routine exercise and mindful eating but has not seen significant changes.  - PA sent to her insurance today for Wegovy    3. Prediabetes.  - Her A1c was slightly elevated last year, indicating a prediabetic state. She experiences symptoms such as lightheadedness, shakiness, and clamminess, especially after consuming sugary drinks.  - A follow-up A1c test will be included in the lab workup to monitor her condition.    4. Sinus infection.  - She reports having had four sinus infections this year, with symptoms including coughing up yellow phlegm and right ear redness.  - Augmentin will be prescribed to address the sinus infection and ear redness. She is advised to take it with food to avoid gastrointestinal upset.  - A referral to an allergist will be made due to persistent allergy symptoms.  - A round of steroids will also be prescribed to help open up her sinuses and manage any allergic reactions.    5. Medication management.  - She still takes Imitrex intermittently for migraines and has requested a refill.  - A prescription for Imitrex will be sent to her pharmacy.  - She also takes Flexeril occasionally for muscle spasms and has requested a refill.  - A prescription for Flexeril will be sent to her pharmacy.        Follow Up   Return in about 1 year (around 6/20/2026) for Annual physical.    Patient or patient representative verbalized consent for the use of Ambient Listening during the visit with  Nyasia Lares DO for chart documentation. 6/20/2025  13:47 EDT  Patient was given instructions and counseling  regarding her condition or for health maintenance advice. Please see specific information pulled into the AVS if appropriate.

## 2025-06-21 LAB
25(OH)D3+25(OH)D2 SERPL-MCNC: 23.8 NG/ML (ref 30–100)
ALBUMIN SERPL-MCNC: 4.7 G/DL (ref 3.9–4.9)
ALP SERPL-CCNC: 82 IU/L (ref 44–121)
ALT SERPL-CCNC: 11 IU/L (ref 0–32)
AST SERPL-CCNC: 15 IU/L (ref 0–40)
BILIRUB SERPL-MCNC: 0.4 MG/DL (ref 0–1.2)
BUN SERPL-MCNC: 12 MG/DL (ref 6–20)
BUN/CREAT SERPL: 18 (ref 9–23)
CALCIUM SERPL-MCNC: 9.6 MG/DL (ref 8.7–10.2)
CHLORIDE SERPL-SCNC: 101 MMOL/L (ref 96–106)
CHOLEST SERPL-MCNC: 194 MG/DL (ref 100–199)
CHOLEST/HDLC SERPL: 4.7 RATIO (ref 0–4.4)
CO2 SERPL-SCNC: 23 MMOL/L (ref 20–29)
CREAT SERPL-MCNC: 0.66 MG/DL (ref 0.57–1)
EGFRCR SERPLBLD CKD-EPI 2021: 116 ML/MIN/1.73
ERYTHROCYTE [DISTWIDTH] IN BLOOD BY AUTOMATED COUNT: 12.5 % (ref 11.7–15.4)
ESTRADIOL SERPL-MCNC: 39.3 PG/ML
FSH SERPL-ACNC: 3.1 MIU/ML
GLOBULIN SER CALC-MCNC: 2.6 G/DL (ref 1.5–4.5)
GLUCOSE SERPL-MCNC: 88 MG/DL (ref 70–99)
HBA1C MFR BLD: 5.6 % (ref 4.8–5.6)
HCT VFR BLD AUTO: 40.3 % (ref 34–46.6)
HDLC SERPL-MCNC: 41 MG/DL
HGB BLD-MCNC: 13.1 G/DL (ref 11.1–15.9)
LDLC SERPL CALC-MCNC: 123 MG/DL (ref 0–99)
LH SERPL-ACNC: 5.6 MIU/ML
MCH RBC QN AUTO: 29.8 PG (ref 26.6–33)
MCHC RBC AUTO-ENTMCNC: 32.5 G/DL (ref 31.5–35.7)
MCV RBC AUTO: 92 FL (ref 79–97)
PLATELET # BLD AUTO: 291 X10E3/UL (ref 150–450)
POTASSIUM SERPL-SCNC: 4 MMOL/L (ref 3.5–5.2)
PROT SERPL-MCNC: 7.3 G/DL (ref 6–8.5)
RBC # BLD AUTO: 4.4 X10E6/UL (ref 3.77–5.28)
SODIUM SERPL-SCNC: 139 MMOL/L (ref 134–144)
T4 FREE SERPL-MCNC: 1.03 NG/DL (ref 0.82–1.77)
TESTOST SERPL-MCNC: 14 NG/DL (ref 8–60)
TRIGL SERPL-MCNC: 170 MG/DL (ref 0–149)
TSH SERPL DL<=0.005 MIU/L-ACNC: 1.42 UIU/ML (ref 0.45–4.5)
VLDLC SERPL CALC-MCNC: 30 MG/DL (ref 5–40)
WBC # BLD AUTO: 8.5 X10E3/UL (ref 3.4–10.8)

## 2025-06-26 DIAGNOSIS — E55.9 VITAMIN D DEFICIENCY: ICD-10-CM

## 2025-06-26 RX ORDER — ERGOCALCIFEROL 1.25 MG/1
50000 CAPSULE, LIQUID FILLED ORAL WEEKLY
Qty: 5 CAPSULE | Refills: 5 | Status: SHIPPED | OUTPATIENT
Start: 2025-06-26

## 2025-06-26 NOTE — TELEPHONE ENCOUNTER
Denied  We denied your request because we did not see what we need to approve the drug you   asked for, (Wegovy). We may be able to approve this drug when we see certain records (records within the last 60 days that you have a weight and height measurement [body mass index (BMI)] of 30 kilograms per meter squared [kg/m2] or more when you were prescribed this drug). We do not see that this applies to you. We based this decision on your health plan's prior authorization clinical criteria named Wegovy (semaglutide).

## 2025-07-02 NOTE — TELEPHONE ENCOUNTER
Approved  PA Case: [id number], Status: Approved, Coverage Starts on: 7/1/2025 12:00:00 AM, Coverage Ends on: 1/13/2026 12:00:00 AM.. Authorization Expiration Date: January 13, 2026.  Left detailed vm for pt.